# Patient Record
Sex: FEMALE | Race: BLACK OR AFRICAN AMERICAN | NOT HISPANIC OR LATINO | Employment: UNEMPLOYED | URBAN - METROPOLITAN AREA
[De-identification: names, ages, dates, MRNs, and addresses within clinical notes are randomized per-mention and may not be internally consistent; named-entity substitution may affect disease eponyms.]

---

## 2021-10-03 ENCOUNTER — HOSPITAL ENCOUNTER (EMERGENCY)
Facility: CLINIC | Age: 21
Discharge: HOME | End: 2021-10-03
Attending: EMERGENCY MEDICINE | Admitting: EMERGENCY MEDICINE
Payer: COMMERCIAL

## 2021-10-03 VITALS
DIASTOLIC BLOOD PRESSURE: 77 MMHG | SYSTOLIC BLOOD PRESSURE: 107 MMHG | WEIGHT: 160 LBS | OXYGEN SATURATION: 100 % | HEART RATE: 96 BPM | TEMPERATURE: 98.5 F | RESPIRATION RATE: 16 BRPM

## 2021-10-03 DIAGNOSIS — R07.0 THROAT PAIN: ICD-10-CM

## 2021-10-03 PROCEDURE — 99284 EMERGENCY DEPT VISIT MOD MDM: CPT | Mod: 25 | Performed by: EMERGENCY MEDICINE

## 2021-10-03 PROCEDURE — 99284 EMERGENCY DEPT VISIT MOD MDM: CPT | Performed by: EMERGENCY MEDICINE

## 2021-10-03 PROCEDURE — 250N000011 HC RX IP 250 OP 636: Performed by: EMERGENCY MEDICINE

## 2021-10-03 PROCEDURE — 96365 THER/PROPH/DIAG IV INF INIT: CPT | Performed by: EMERGENCY MEDICINE

## 2021-10-03 PROCEDURE — 250N000013 HC RX MED GY IP 250 OP 250 PS 637: Performed by: EMERGENCY MEDICINE

## 2021-10-03 RX ORDER — DIPHENHYDRAMINE HCL 25 MG
50 TABLET ORAL EVERY 6 HOURS PRN
Qty: 30 TABLET | Refills: 0 | Status: SHIPPED | OUTPATIENT
Start: 2021-10-03

## 2021-10-03 RX ORDER — FAMOTIDINE 20 MG/1
20 TABLET, FILM COATED ORAL 2 TIMES DAILY
Qty: 10 TABLET | Refills: 0 | Status: SHIPPED | OUTPATIENT
Start: 2021-10-03

## 2021-10-03 RX ORDER — EPINEPHRINE 0.3 MG/.3ML
0.3 INJECTION SUBCUTANEOUS
Qty: 2 EACH | Refills: 0 | Status: SHIPPED | OUTPATIENT
Start: 2021-10-03

## 2021-10-03 RX ORDER — DIPHENHYDRAMINE HCL 50 MG
50 CAPSULE ORAL ONCE
Status: COMPLETED | OUTPATIENT
Start: 2021-10-03 | End: 2021-10-03

## 2021-10-03 RX ADMIN — FAMOTIDINE 20 MG: 20 INJECTION, SOLUTION INTRAVENOUS at 17:44

## 2021-10-03 RX ADMIN — DIPHENHYDRAMINE HYDROCHLORIDE 50 MG: 50 CAPSULE ORAL at 17:06

## 2021-10-03 NOTE — ED PROVIDER NOTES
ED Provider Note  [facility]      History     Chief Complaint   Patient presents with     Allergic Reaction     Patient was eating at an Chinese restaurant 20 minutes prior to arrival when she started to have a stabbing sensation in her ear and now has that same feeling in her throat. Patient tried a Sambosa for the first time.     The history is provided by the patient.     Anitra Espitia is a 21 year old female presents to the emergency department today with a funny feeling in her throat.  She states she ate at an Chinese restaurant, had samosa for the first time.  She states that after taking just a few bites she suddenly felt a strange discomfort in her throat, and states it feels a little hard to swallow.  She denies any itching feeling, states it feels more like discomfort or pain.  She states it feels different than her normal sore throat, and it came on very suddenly.  No stuffy nose.  No shortness of breath, rash, itchiness, vomiting, diarrhea.  No other acute complaints.  She was concerned for allergic reaction.    This part of the medical record was transcribed by Ester Ramirez Scribnallely, from a dictation done by Vannesa Tello MD.       Past Medical History  History reviewed. No pertinent past medical history.  History reviewed. No pertinent surgical history.  diphenhydrAMINE (BENADRYL) 25 MG tablet  EPINEPHrine (ANY BX GENERIC EQUIV) 0.3 MG/0.3ML injection 2-pack  famotidine (PEPCID) 20 MG tablet      No Known Allergies  Family History  History reviewed. No pertinent family history.  Social History   Social History     Tobacco Use     Smoking status: Never Smoker     Smokeless tobacco: Never Used   Substance Use Topics     Alcohol use: Not Currently     Drug use: Not Currently      Past medical history, past surgical history, medications, allergies, family history, and social history were reviewed with the patient. No additional pertinent items.       Review  "of Systems  A complete review of systems was performed with pertinent positives and negatives noted in the HPI, and all other systems negative.    Physical Exam   BP: 117/83  Pulse: 78  Temp: 98.5  F (36.9  C)  Resp: 14  Weight: 72.6 kg (160 lb)  SpO2: 100 %  Physical Exam  Constitutional:       General: She is not in acute distress.     Appearance: She is not diaphoretic.   HENT:      Head: Atraumatic.      Mouth/Throat:      Pharynx: No oropharyngeal exudate.   Eyes:      General: No scleral icterus.  Cardiovascular:      Heart sounds: Normal heart sounds.   Pulmonary:      Effort: No respiratory distress.      Breath sounds: Normal breath sounds.   Abdominal:      Palpations: Abdomen is soft.      Tenderness: There is no abdominal tenderness.   Musculoskeletal:         General: No tenderness.   Skin:     General: Skin is warm.      Findings: No rash.         ED Course      Procedures              No results found for any visits on 10/03/21.  Medications   diphenhydrAMINE (BENADRYL) capsule 50 mg (50 mg Oral Given 10/3/21 1706)   famotidine (PEPCID) infusion 20 mg (0 mg Intravenous Stopped 10/3/21 1805)        Assessments & Plan (with Medical Decision Making)   Hard to really know what is happening with his presentation.  Her oropharynx appears normal.  She has no clear evidence for an allergic reaction such as rash, complain of itchiness, visible sinus swelling.  She is not complaining of any itchy feeling in her throat, just some throat discomfort.  She does state it feels, \"strange or weird to swallow.  She states that this came on very suddenly after taking just a couple bites of a new food.  I did consider giving her epinephrine, but as I was not convinced this was anaphylaxis, she is breathing normally without any wheeze, no objective sign of swelling, we started instead with Benadryl and famotidine.  On repeat evaluation she states she feels improved compared with presentation.  She states now it just feels " like she has a sore throat.  Oropharynx remains clear.  Its unclear to me whether she may have swallowed something that caused some trauma to her back of her throat, perhaps burned the back of her throat.  Again, everything looks normal on oropharyngeal exam.  I still am not convinced that this represented any type of allergic reaction, though I cannot be certain that it was not.  I will give her prescription for Benadryl and famotidine, recommend that she use Benadryl scheduled for the next 24 hours, then as needed after that.  She is recommended to use famotidine for the next 5 days.  I also did give her a prescription for EpiPen, encouraged her to use that if she feels that she has any swelling in her mouth or throat, lightheadedness, shortness of breath.  She is told that if she needs to use this she needs to present to ED to be monitored and for further evaluation.  I do recommend she follow-up with primary care next week. This does not seem to represent discrete pharyngitis, simply just given the sudden onset of symptoms.  Patient is swallowing, does not seem to have any sort of foreign body present.  No drooling.  I do think she is safe for discharge at this time, though she is encouraged to return with any concerns.  She verbalizes understanding is agreeable plan.    This part of the medical record was transcribed by Alberta Lopez Medical Scribe, from a dictation done by Vannesa Tello MD.       I have reviewed the nursing notes. I have reviewed the findings, diagnosis, plan and need for follow up with the patient.    Discharge Medication List as of 10/3/2021  7:13 PM      START taking these medications    Details   diphenhydrAMINE (BENADRYL) 25 MG tablet Take 2 tablets (50 mg) by mouth every 6 hours as needed for allergies, Disp-30 tablet, R-0, Local Print      EPINEPHrine (ANY BX GENERIC EQUIV) 0.3 MG/0.3ML injection 2-pack Inject 0.3 mLs (0.3 mg) into the muscle once as needed for anaphylaxis, Disp-2  each, R-0, Local Print      famotidine (PEPCID) 20 MG tablet Take 1 tablet (20 mg) by mouth 2 times daily, Disp-10 tablet, R-0, Local Print             Final diagnoses:   Throat pain       --  Vannesa Tello MD  [facility] EMERGENCY DEPARTMENT  10/3/2021     Vannesa Tello MD  10/03/21 2030

## 2021-10-03 NOTE — ED NOTES
"Pt describes having a \"stinging\" sensation in the back of her throat.  She denies any itching of her throat.  No swelling noted in the oropharynx.   Respirations are easy, regular, and unlabored.  Lungs are CTAB.  Pt speaks easily in full sentences.        "

## 2021-10-04 NOTE — ED NOTES
Pt verbalizes that she has no discomfort in her throat.  She tells me that the stinging sensation has resolved.   Respirations continue to be easy, regular, and unlabored.